# Patient Record
Sex: FEMALE | Race: WHITE
[De-identification: names, ages, dates, MRNs, and addresses within clinical notes are randomized per-mention and may not be internally consistent; named-entity substitution may affect disease eponyms.]

---

## 2017-12-22 ENCOUNTER — HOSPITAL ENCOUNTER (EMERGENCY)
Dept: HOSPITAL 26 - MED | Age: 42
Discharge: HOME | End: 2017-12-22
Payer: COMMERCIAL

## 2017-12-22 VITALS — SYSTOLIC BLOOD PRESSURE: 116 MMHG | DIASTOLIC BLOOD PRESSURE: 81 MMHG

## 2017-12-22 VITALS — HEIGHT: 64 IN | WEIGHT: 186.06 LBS | BODY MASS INDEX: 31.77 KG/M2

## 2017-12-22 DIAGNOSIS — S46.911A: Primary | ICD-10-CM

## 2017-12-22 DIAGNOSIS — J45.909: ICD-10-CM
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
## 2017-12-22 DIAGNOSIS — S80.211A: ICD-10-CM

## 2017-12-22 DIAGNOSIS — Z79.899: ICD-10-CM
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
## 2017-12-22 NOTE — NUR
Patient discharged with v/s stable. Written and verbal after care instructions 
given and explained. 

Patient alert, oriented and verbalized understanding of instructions. 
Ambulatory with steady gait. All questions addressed prior to discharge. ID 
band removed. Patient advised to follow up with PMD. Rx of tramadol given. 
Patient educated on indication of medication including possible reaction and 
side effects. Opportunity to ask questions provided and answered.

## 2017-12-22 NOTE — NUR
42/F PRESENT TO ER C/O FALL x YESTERDAY @ 1515. PT STATES SHE TRIPPED AND 
LANDED ON HER RT KNEE AND RT UPPER EXTREMITY. PT DENIES KO/LOC. PT COMPLAINS OF 
RT WRIST, RT KNEE AND RT SHOULDER PAIN. AAOx4, PERRLA, BREATHING EVEN AND 
UNLABORED. ERMD NOTIFIED OF PATIENT STATUS.

## 2017-12-22 NOTE — NUR
ace bandage placed to right knee---encouraged to apply ice to right knee 
ecchymoses with 2 abrasions, mild swelling tender---

right shoulder pain tender, right elbow and right wrist pain tender no obvious 
deformities noted--no discoloration noted  noah anthony

## 2018-03-01 ENCOUNTER — HOSPITAL ENCOUNTER (EMERGENCY)
Dept: HOSPITAL 1 - ED | Age: 43
Discharge: LEFT BEFORE DISCHARGE | End: 2018-03-01
Payer: COMMERCIAL

## 2018-03-01 VITALS — HEIGHT: 62.01 IN | WEIGHT: 189.99 LBS | BODY MASS INDEX: 34.52 KG/M2

## 2018-03-01 VITALS — DIASTOLIC BLOOD PRESSURE: 74 MMHG | SYSTOLIC BLOOD PRESSURE: 122 MMHG

## 2018-03-01 DIAGNOSIS — M25.569: ICD-10-CM

## 2018-03-01 DIAGNOSIS — C85.90: ICD-10-CM

## 2018-03-01 DIAGNOSIS — Z98.890: ICD-10-CM

## 2018-03-01 DIAGNOSIS — Z90.710: ICD-10-CM

## 2018-03-01 DIAGNOSIS — M25.519: Primary | ICD-10-CM

## 2018-03-01 DIAGNOSIS — Z90.89: ICD-10-CM

## 2018-04-05 ENCOUNTER — HOSPITAL ENCOUNTER (EMERGENCY)
Dept: HOSPITAL 26 - MED | Age: 43
Discharge: LEFT BEFORE DISCHARGE | End: 2018-04-05
Payer: COMMERCIAL

## 2018-04-05 VITALS — DIASTOLIC BLOOD PRESSURE: 79 MMHG | SYSTOLIC BLOOD PRESSURE: 116 MMHG

## 2018-04-05 VITALS — BODY MASS INDEX: 34.96 KG/M2 | HEIGHT: 62 IN | WEIGHT: 190 LBS

## 2018-04-05 DIAGNOSIS — M25.511: Primary | ICD-10-CM

## 2018-04-05 DIAGNOSIS — Z53.21: ICD-10-CM

## 2019-10-16 ENCOUNTER — HOSPITAL ENCOUNTER (EMERGENCY)
Dept: HOSPITAL 26 - MED | Age: 44
Discharge: HOME | End: 2019-10-16
Payer: COMMERCIAL

## 2019-10-16 VITALS — WEIGHT: 190 LBS | BODY MASS INDEX: 32.44 KG/M2 | HEIGHT: 64 IN

## 2019-10-16 VITALS — SYSTOLIC BLOOD PRESSURE: 117 MMHG | DIASTOLIC BLOOD PRESSURE: 83 MMHG

## 2019-10-16 VITALS — DIASTOLIC BLOOD PRESSURE: 78 MMHG | SYSTOLIC BLOOD PRESSURE: 123 MMHG

## 2019-10-16 DIAGNOSIS — J45.909: ICD-10-CM

## 2019-10-16 DIAGNOSIS — R05: Primary | ICD-10-CM

## 2019-10-16 DIAGNOSIS — Z79.899: ICD-10-CM

## 2021-12-06 ENCOUNTER — HOSPITAL ENCOUNTER (EMERGENCY)
Dept: HOSPITAL 26 - MED | Age: 46
Discharge: LEFT BEFORE DISCHARGE | End: 2021-12-06
Payer: COMMERCIAL

## 2021-12-06 DIAGNOSIS — Z53.21: Primary | ICD-10-CM

## 2021-12-06 NOTE — NUR
FINAL ATTEMPT TO CALL PT, NO ANSWER. PATIENT LEFT WITHOUT BEING SEEN BY DR. GONZALEZ. NO FURTHER CARE PROVIDED FOR PATIENT.

## 2021-12-09 ENCOUNTER — HOSPITAL ENCOUNTER (EMERGENCY)
Dept: HOSPITAL 26 - MED | Age: 46
Discharge: HOME | End: 2021-12-09
Payer: COMMERCIAL

## 2021-12-09 VITALS — HEIGHT: 65 IN | WEIGHT: 190.13 LBS | BODY MASS INDEX: 31.68 KG/M2

## 2021-12-09 VITALS — DIASTOLIC BLOOD PRESSURE: 78 MMHG | SYSTOLIC BLOOD PRESSURE: 123 MMHG

## 2021-12-09 DIAGNOSIS — S46.811A: ICD-10-CM

## 2021-12-09 DIAGNOSIS — Z79.899: ICD-10-CM

## 2021-12-09 DIAGNOSIS — Z85.9: ICD-10-CM
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
## 2021-12-09 DIAGNOSIS — J45.909: ICD-10-CM

## 2021-12-09 DIAGNOSIS — S46.911A: Primary | ICD-10-CM
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
## 2021-12-09 PROCEDURE — 73080 X-RAY EXAM OF ELBOW: CPT

## 2021-12-09 PROCEDURE — 96372 THER/PROPH/DIAG INJ SC/IM: CPT

## 2021-12-09 PROCEDURE — 99284 EMERGENCY DEPT VISIT MOD MDM: CPT

## 2021-12-09 PROCEDURE — 73030 X-RAY EXAM OF SHOULDER: CPT

## 2021-12-09 RX ADMIN — KETOROLAC TROMETHAMINE ONE MG: 30 INJECTION, SOLUTION INTRAMUSCULAR; INTRAVENOUS at 13:07

## 2021-12-09 NOTE — NUR
Patient discharged with v/s stable. Written and verbal after care instructions 
given and explained. 

Patient alert, oriented and verbalized understanding of instructions. 
Ambulatory with steady gait. All questions addressed prior to discharge. ID 
band removed. Patient advised to follow up with PMD. Rx of IBUPROFEN given. 
Opportunity to ask questions provided and answered.

## 2022-08-02 ENCOUNTER — HOSPITAL ENCOUNTER (EMERGENCY)
Dept: HOSPITAL 26 - MED | Age: 47
Discharge: LEFT BEFORE DISCHARGE | End: 2022-08-02
Payer: COMMERCIAL

## 2022-08-02 DIAGNOSIS — Z53.21: ICD-10-CM

## 2022-08-02 DIAGNOSIS — R53.1: Primary | ICD-10-CM

## 2023-05-10 ENCOUNTER — HOSPITAL ENCOUNTER (EMERGENCY)
Dept: HOSPITAL 26 - MED | Age: 48
Discharge: HOME | End: 2023-05-10
Payer: COMMERCIAL

## 2023-05-10 VITALS — SYSTOLIC BLOOD PRESSURE: 141 MMHG | DIASTOLIC BLOOD PRESSURE: 81 MMHG

## 2023-05-10 VITALS — WEIGHT: 181 LBS | HEIGHT: 62 IN | BODY MASS INDEX: 33.31 KG/M2
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
## 2023-05-10 DIAGNOSIS — S90.821A: Primary | ICD-10-CM
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
## 2023-05-10 DIAGNOSIS — Z79.899: ICD-10-CM

## 2023-05-10 DIAGNOSIS — J45.909: ICD-10-CM
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
## 2023-05-10 NOTE — NUR
48 YO FEMALE BIBS PRESENTS TO THE ED WITH C/O  ABSCESS TO LEFT FOOT BETWEEN BIG 
TOE AND MIDDLE TOE. PATIENT STATES SHE WENT TO A CONCERT LAST NIGHT AT A [facility] 
WHEN HER FOOT STARTED BOTHERING HER, THIS MORNING SHE NOTICED A BUMP ON THE 
BOTTOM OF  RIGHT FOOT.